# Patient Record
Sex: FEMALE | URBAN - METROPOLITAN AREA
[De-identification: names, ages, dates, MRNs, and addresses within clinical notes are randomized per-mention and may not be internally consistent; named-entity substitution may affect disease eponyms.]

---

## 2019-12-18 ENCOUNTER — OFFICE VISIT (OUTPATIENT)
Dept: INTERNAL MEDICINE CLINIC | Age: 44
End: 2019-12-18

## 2019-12-18 VITALS
HEIGHT: 65 IN | HEART RATE: 77 BPM | BODY MASS INDEX: 24.16 KG/M2 | RESPIRATION RATE: 16 BRPM | TEMPERATURE: 97.4 F | DIASTOLIC BLOOD PRESSURE: 74 MMHG | WEIGHT: 145 LBS | OXYGEN SATURATION: 99 % | SYSTOLIC BLOOD PRESSURE: 117 MMHG

## 2019-12-18 DIAGNOSIS — R14.0 BLOATING SYMPTOM: ICD-10-CM

## 2019-12-18 DIAGNOSIS — Z00.00 PHYSICAL EXAM, ANNUAL: Primary | ICD-10-CM

## 2019-12-18 NOTE — PROGRESS NOTES
HISTORY OF PRESENT ILLNESS  Dana Bojorquez is a 45 y.o. female. HPI   Pt is here to establish care. BP  today is 117/74         Wt today is 145 lbs  Her wt is in nl ranges       Will get labs today       Pt thinks she has a gluten sensitivity   She states when she overeats carbs, alcohol or sugars she wakes up with heart palpitations, sweating   She would then vomit   She also states she gets some bloating with these sxs   She states this has been happening for about 10 years   States more recent episode was when she was home for [email or web address], did not have alcohol then   She has not had gluten in 13 days and has not noticed sxs   Will check celiac sprue and H pylori   Discussed alcohol can also have these effects   Discussed staying away from dietary triggers         Pt is going to [place] this summer   She knows she has had hep B series, unsure about hep A   Will check for immunity   Discussed going to travel clinics         PMHx: none     FMHx: father- heart disease (massive MI 17 years ago in his mid 46s), htn   Mother- htn, MS   Grandmother-- stomach cancer     PSHx:none     SHx: not , no kids   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 No past surgical history on file.    No results found for: WBC, WBCT, WBCPOC, HGB, HGBPOC, HCT, HCTPOC, PLT, PLTPOC, MCV, MCVPOC, HGBEXT, HCTEXT, PLTEXT  No results found for: CHOL, CHOLPOCT, HDL, LDL, LDLC, LDLCPOC, LDLCEXT, TRIGL, TGLPOCT, CHHD, CHHDX  No results found for: GFRNA, GFRNAPOC, GFRAA, GFRAAPOC, CREA, CREAPOC, BUN, IBUN, BUNPOC, NA, NAPOC, K, KPOCT, CL, CLPOC, CO2, CO2POC, MG, PHOS, ALBEU, PTH, PTHILT, EPO     Review of Systems   Constitutional: Negative for chills and fever. HENT: Negative for hearing loss and tinnitus. Eyes: Negative for blurred vision and double vision. Respiratory: Negative for shortness of breath and wheezing. Cardiovascular: Negative for chest pain and palpitations. Gastrointestinal: Positive for abdominal pain and nausea. Negative for vomiting. Genitourinary: Negative for dysuria and frequency. Musculoskeletal: Negative for back pain and falls. Skin: Negative for itching and rash. Neurological: Negative for dizziness, loss of consciousness and headaches. Psychiatric/Behavioral: Negative for depression. The patient is not nervous/anxious. Physical Exam  Constitutional:       General: She is not in acute distress. Appearance: She is well-developed. She is not diaphoretic. HENT:      Head: Normocephalic and atraumatic. Right Ear: Tympanic membrane, ear canal and external ear normal.      Left Ear: Tympanic membrane, ear canal and external ear normal.      Mouth/Throat:      Mouth: Mucous membranes are moist.      Pharynx: Oropharynx is clear. No oropharyngeal exudate or posterior oropharyngeal erythema. Eyes:      General: No scleral icterus. Right eye: No discharge. Left eye: No discharge. Conjunctiva/sclera: Conjunctivae normal.      Pupils: Pupils are equal, round, and reactive to light. Neck:      Musculoskeletal: Normal range of motion and neck supple. Vascular: No carotid bruit. Cardiovascular:      Rate and Rhythm: Normal rate and regular rhythm. Heart sounds: Normal heart sounds. No murmur. No friction rub. No gallop. Pulmonary:      Effort: Pulmonary effort is normal. No respiratory distress. Breath sounds: Normal breath sounds. No wheezing or rales. Chest:      Chest wall: No tenderness.    Abdominal:      General: There is no distension. Palpations: Abdomen is soft. There is no mass. Tenderness: There is no tenderness. There is no guarding or rebound. Hernia: No hernia is present. Musculoskeletal: Normal range of motion. General: No tenderness or deformity. Lymphadenopathy:      Cervical: No cervical adenopathy. Skin:     General: Skin is warm and dry. Coloration: Skin is not pale. Findings: No erythema or rash. Neurological:      Mental Status: She is alert and oriented to person, place, and time. Coordination: Coordination normal.   Psychiatric:         Mood and Affect: Mood normal.         Behavior: Behavior normal.         ASSESSMENT and PLAN    ICD-10-CM ICD-9-CM    1. Physical exam, annual                Nl wt nl bp flu shot utd mammo due she will schedule at [facility] pelvic exam utd eye exam due she will look up tdap records colo not yet needed labs ordered  Z00.00 V70.0 LIPID PANEL      METABOLIC PANEL, COMPREHENSIVE      CBC W/O DIFF      TSH 3RD GENERATION      HEMOGLOBIN A1C WITH EAG      HAV/HBV IMMUNE STATUS (PRO IV)      CELIAC ANTIBODY PROFILE   2. Bloating symptom                    Seems primarily related to high carb loads and alcohol discussed avoiding trigger foods will check celiac and hy pylori discussed egd for progressive sxs  R14.0 787.3 H PYLORI ABS, IGA AND IGG          Depression screen reviewed and negative. Scribed by Shyann Mckoy of [address] [place] [address] [address], as dictated by Dr. Raghu Bravo. Current diagnosis and concerns discussed with pt at length. Pt understands risks and benefits or current treatment plan and medications, and accepts the treatment and medication with any possible risks. Pt asks appropriate questions, which were answered. Pt was instructed to call with any concerns or problems. I have reviewed the note documented by the scribe. The services provided are my own.   The documentation is accurate

## 2019-12-19 LAB
ALBUMIN SERPL-MCNC: 4.2 G/DL (ref 3.5–5.5)
ALBUMIN/GLOB SERPL: 1.6 {RATIO} (ref 1.2–2.2)
ALP SERPL-CCNC: 49 IU/L (ref 39–117)
ALT SERPL-CCNC: 17 IU/L (ref 0–32)
AST SERPL-CCNC: 17 IU/L (ref 0–40)
BILIRUB SERPL-MCNC: 1 MG/DL (ref 0–1.2)
BUN SERPL-MCNC: 9 MG/DL (ref 6–24)
BUN/CREAT SERPL: 12 (ref 9–23)
CALCIUM SERPL-MCNC: 8.8 MG/DL (ref 8.7–10.2)
CHLORIDE SERPL-SCNC: 102 MMOL/L (ref 96–106)
CHOLEST SERPL-MCNC: 189 MG/DL (ref 100–199)
CO2 SERPL-SCNC: 22 MMOL/L (ref 20–29)
CREAT SERPL-MCNC: 0.74 MG/DL (ref 0.57–1)
ERYTHROCYTE [DISTWIDTH] IN BLOOD BY AUTOMATED COUNT: 11.8 % (ref 12.3–15.4)
EST. AVERAGE GLUCOSE BLD GHB EST-MCNC: 97 MG/DL
GLIADIN PEPTIDE IGA SER-ACNC: 3 UNITS (ref 0–19)
GLIADIN PEPTIDE IGG SER-ACNC: 4 UNITS (ref 0–19)
GLOBULIN SER CALC-MCNC: 2.7 G/DL (ref 1.5–4.5)
GLUCOSE SERPL-MCNC: 80 MG/DL (ref 65–99)
H PYLORI IGA SER-ACNC: <9 UNITS (ref 0–8.9)
H PYLORI IGG SER IA-ACNC: 0.12 INDEX VALUE (ref 0–0.79)
HAV AB SER QL IA: NEGATIVE
HAV IGM SERPL QL IA: NEGATIVE
HBA1C MFR BLD: 5 % (ref 4.8–5.6)
HBV CORE AB SERPL QL IA: NEGATIVE
HBV SURFACE AB SER QL: REACTIVE
HCT VFR BLD AUTO: 41.7 % (ref 34–46.6)
HDLC SERPL-MCNC: 56 MG/DL
HGB BLD-MCNC: 13.8 G/DL (ref 11.1–15.9)
IGA SERPL-MCNC: 324 MG/DL (ref 87–352)
LDLC SERPL CALC-MCNC: 114 MG/DL (ref 0–99)
MCH RBC QN AUTO: 30.7 PG (ref 26.6–33)
MCHC RBC AUTO-ENTMCNC: 33.1 G/DL (ref 31.5–35.7)
MCV RBC AUTO: 93 FL (ref 79–97)
PLATELET # BLD AUTO: 336 X10E3/UL (ref 150–450)
POTASSIUM SERPL-SCNC: 4.5 MMOL/L (ref 3.5–5.2)
PROT SERPL-MCNC: 6.9 G/DL (ref 6–8.5)
RBC # BLD AUTO: 4.49 X10E6/UL (ref 3.77–5.28)
SODIUM SERPL-SCNC: 138 MMOL/L (ref 134–144)
TRIGL SERPL-MCNC: 97 MG/DL (ref 0–149)
TSH SERPL DL<=0.005 MIU/L-ACNC: 2.22 UIU/ML (ref 0.45–4.5)
TTG IGA SER-ACNC: <2 U/ML (ref 0–3)
TTG IGG SER-ACNC: 2 U/ML (ref 0–5)
VLDLC SERPL CALC-MCNC: 19 MG/DL (ref 5–40)
WBC # BLD AUTO: 5.8 X10E3/UL (ref 3.4–10.8)

## 2021-05-11 ENCOUNTER — OFFICE VISIT (OUTPATIENT)
Dept: INTERNAL MEDICINE CLINIC | Age: 46
End: 2021-05-11
Payer: COMMERCIAL

## 2021-05-11 VITALS
DIASTOLIC BLOOD PRESSURE: 77 MMHG | HEART RATE: 86 BPM | OXYGEN SATURATION: 97 % | BODY MASS INDEX: 25.26 KG/M2 | WEIGHT: 151.6 LBS | SYSTOLIC BLOOD PRESSURE: 122 MMHG | RESPIRATION RATE: 16 BRPM | TEMPERATURE: 97.8 F | HEIGHT: 65 IN

## 2021-05-11 DIAGNOSIS — Z00.00 PHYSICAL EXAM: Primary | ICD-10-CM

## 2021-05-11 LAB
ALBUMIN SERPL-MCNC: 3.4 G/DL (ref 3.5–5)
ALBUMIN/GLOB SERPL: 1.1 {RATIO} (ref 1.1–2.2)
ALP SERPL-CCNC: 57 U/L (ref 45–117)
ALT SERPL-CCNC: 16 U/L (ref 12–78)
ANION GAP SERPL CALC-SCNC: 5 MMOL/L (ref 5–15)
AST SERPL-CCNC: 8 U/L (ref 15–37)
BILIRUB SERPL-MCNC: 0.5 MG/DL (ref 0.2–1)
BUN SERPL-MCNC: 13 MG/DL (ref 6–20)
BUN/CREAT SERPL: 18 (ref 12–20)
CALCIUM SERPL-MCNC: 8.4 MG/DL (ref 8.5–10.1)
CHLORIDE SERPL-SCNC: 111 MMOL/L (ref 97–108)
CHOLEST SERPL-MCNC: 193 MG/DL
CO2 SERPL-SCNC: 25 MMOL/L (ref 21–32)
CREAT SERPL-MCNC: 0.72 MG/DL (ref 0.55–1.02)
ERYTHROCYTE [DISTWIDTH] IN BLOOD BY AUTOMATED COUNT: 13.2 % (ref 11.5–14.5)
GLOBULIN SER CALC-MCNC: 3.2 G/DL (ref 2–4)
GLUCOSE SERPL-MCNC: 91 MG/DL (ref 65–100)
HCT VFR BLD AUTO: 40.5 % (ref 35–47)
HCV AB SERPL QL IA: NONREACTIVE
HCV COMMENT,HCGAC: NORMAL
HDLC SERPL-MCNC: 60 MG/DL
HDLC SERPL: 3.2 {RATIO} (ref 0–5)
HGB BLD-MCNC: 13.1 G/DL (ref 11.5–16)
LDLC SERPL CALC-MCNC: 120.4 MG/DL (ref 0–100)
LIPID PROFILE,FLP: ABNORMAL
MCH RBC QN AUTO: 30.7 PG (ref 26–34)
MCHC RBC AUTO-ENTMCNC: 32.3 G/DL (ref 30–36.5)
MCV RBC AUTO: 94.8 FL (ref 80–99)
NRBC # BLD: 0 K/UL (ref 0–0.01)
NRBC BLD-RTO: 0 PER 100 WBC
PLATELET # BLD AUTO: 335 K/UL (ref 150–400)
PMV BLD AUTO: 9.8 FL (ref 8.9–12.9)
POTASSIUM SERPL-SCNC: 4.7 MMOL/L (ref 3.5–5.1)
PROT SERPL-MCNC: 6.6 G/DL (ref 6.4–8.2)
RBC # BLD AUTO: 4.27 M/UL (ref 3.8–5.2)
SODIUM SERPL-SCNC: 141 MMOL/L (ref 136–145)
TRIGL SERPL-MCNC: 63 MG/DL (ref ?–150)
TSH SERPL DL<=0.05 MIU/L-ACNC: 1.34 UIU/ML (ref 0.36–3.74)
VLDLC SERPL CALC-MCNC: 12.6 MG/DL
WBC # BLD AUTO: 6.3 K/UL (ref 3.6–11)

## 2021-05-11 PROCEDURE — 99396 PREV VISIT EST AGE 40-64: CPT | Performed by: INTERNAL MEDICINE

## 2021-10-06 NOTE — PROGRESS NOTES
HISTORY OF PRESENT ILLNESS  Alex Santoyo is a 42 y.o. female. HPI     Last here 5/11/21. Here for acute care.    This is an established visit completed with telemedicine was completed with video assist  the patient acknowledges and agrees to this method of visitation amirahyme    She states that a month ago on a hike she was feeling off, had stomach ache and had to call off the hike  She then got really bad heartburn, stated that this was debilitating and went to an urgent care  She states that this started to subside on her own after 5-6 hours, mentions that she was given meds including lidocaine for this but unsure whether this helped  She also tried to take tums for this  She also mentions that for 2 weeks she was constipated, lightheaded and generally feeling off  Her sxs her started to improve over the last week  Denies exertional CP  She states that she did not get an EKG at the urgent care, unsure whether or not they checked troponins  Denies current reflux or heartburn sxs  She states that the only other time she experienced this type of heartburn was after her moderna covid vaccine for 4 hours  Discussed this could have been angioedema, discussed that this can be d/t meds or can be idiopathic, zyrtec can help with this  Discussed this can also be vagal response, esophageal spasms, does not sound cardiac  Discussed if this were to happen again go to ED, take prilosec every AM and pepcid in evening, could also add zyrtec if particularly bad  Mentioned that if this happens again check BP to see if it is dropping  Discussed that moderna vaccine may have been initial trigger, but was unlikely to have caused the second episode  Discussed if this happens again journaling what she ate that day     BP today is controlled  BP at urgent care 997C systolic     Wt was 707 lbs lov     Reviewed labs      PREVENTIVE:  Colonoscopy: not yet needed   Pap: Dr Claire Reap 1/21  Mammogram: 1/21  Dexa: not yet needed   Tdap: pt thinks she is utd, she will check   Pneumovax: not yet needed   Shingrix: not yet needed   Flu shot: Fall 2021  Eye exam: [facility] 2/21  Lipids: 5/21   Hep B series: completed   Hep A series: holding off for now  A1c: 12/19 5.0  Hep C screen: 5/21 negative  Covid: both (moderna)    There are no problems to display for this patient. Current Outpatient Medications   Medication Sig Dispense Refill    levonorgestrel-ethin estradiol (LEVONORGESTREL-ETHINYL ESTRAD PO) levonorgestrel       History reviewed. No pertinent surgical history. Lab Results   Component Value Date/Time    WBC 6.3 05/11/2021 10:59 AM    HGB 13.1 05/11/2021 10:59 AM    HCT 40.5 05/11/2021 10:59 AM    PLATELET 285 86/61/2295 10:59 AM    MCV 94.8 05/11/2021 10:59 AM     Lab Results   Component Value Date/Time    Cholesterol, total 193 05/11/2021 10:59 AM    HDL Cholesterol 60 05/11/2021 10:59 AM    LDL, calculated 120.4 (H) 05/11/2021 10:59 AM    Triglyceride 63 05/11/2021 10:59 AM    CHOL/HDL Ratio 3.2 05/11/2021 10:59 AM     Lab Results   Component Value Date/Time    GFR est non-AA >60 05/11/2021 10:59 AM    GFR est AA >60 05/11/2021 10:59 AM    Creatinine 0.72 05/11/2021 10:59 AM    BUN 13 05/11/2021 10:59 AM    Sodium 141 05/11/2021 10:59 AM    Potassium 4.7 05/11/2021 10:59 AM    Chloride 111 (H) 05/11/2021 10:59 AM    CO2 25 05/11/2021 10:59 AM        Review of Systems   Respiratory: Negative for shortness of breath. Cardiovascular: Negative for chest pain. Physical Exam  Constitutional:       General: She is not in acute distress. Appearance: Normal appearance. She is not ill-appearing, toxic-appearing or diaphoretic. HENT:      Head: Normocephalic and atraumatic. Eyes:      General:         Right eye: No discharge. Left eye: No discharge. Conjunctiva/sclera: Conjunctivae normal.   Pulmonary:      Effort: No respiratory distress.    Neurological:      Mental Status: She is alert and oriented to person, place, and time. Mental status is at baseline. Gait: Gait normal.   Psychiatric:         Mood and Affect: Mood normal.         Behavior: Behavior normal.         ASSESSMENT and PLAN    ICD-10-CM ICD-9-CM    1. Gastroesophageal reflux disease without esophagitis         Patient reports an episode of GI upset and weakness associated with it    This occurred about a month ago and she had an evaluation at an urgent care which ultimately was unremarkable    She had 1 day of more severe symptoms and then some mild symptoms the following week now resolved    Incidentally when she had her Covid vaccine she had similar symptoms    Symptoms appear to be GI unclear if she is having a vagal event from GI upset causing her to feel weak versus some sort of angioedema of the gut causing her symptoms    Ultimately her symptoms have all resolved and there was no clear inciting event for the episode a few weeks ago    Discussed happens again to journal with food that she ate that day    Discussed if it happens again we will start treatment with the Prilosec and Pepcid and potentially could add an antihistamine in case of allergic reaction    No additional work-up for now given isolated event that resolved on its own but would need a more thorough GI evaluation if this became a recurrent problem discussed this at great length K21.9 530.81    2. Chronic fatigue see above R53.82 780.79         Depression screen reviewed and negative     Scribed by Stacie Garcia, as dictated by Dr. Stephy Mcgill. Current diagnosis and concerns discussed with pt at length. Pt understands risks and benefits or current treatment plan and medications, and accepts the treatment and medication with any possible risks. Pt asks appropriate questions, which were answered. Pt was instructed to call with any concerns or problems. I have reviewed the note documented by the scribe. The services provided are my own.   The documentation is accurate     Yamileth Lorenzo was evaluated through a synchronous (real-time) audio-video encounter. The patient (or guardian if applicable) is aware that this is a billable service. Verbal consent to proceed has been obtained within the past 12 months. The visit was conducted pursuant to the emergency declaration under the [address] [place] [address] and the [employer] Act. Patient identification was verified, and a caregiver was present when appropriate. The patient was located in a state where the provider was credentialed to provide care.

## 2021-10-08 ENCOUNTER — VIRTUAL VISIT (OUTPATIENT)
Dept: INTERNAL MEDICINE CLINIC | Age: 46
End: 2021-10-08
Payer: COMMERCIAL

## 2021-10-08 DIAGNOSIS — K21.9 GASTROESOPHAGEAL REFLUX DISEASE WITHOUT ESOPHAGITIS: Primary | ICD-10-CM

## 2021-10-08 DIAGNOSIS — R53.82 CHRONIC FATIGUE: ICD-10-CM

## 2021-10-08 PROCEDURE — 99213 OFFICE O/P EST LOW 20 MIN: CPT | Performed by: INTERNAL MEDICINE

## 2022-06-01 NOTE — PROGRESS NOTES
HISTORY OF PRESENT ILLNESS  Delmar Epperson is a 42 y.o. female. HPI     Last here vv 10/08/21. Here for routine care. She c/o lower back pain, states that this is burning and sometimes goes down leg  This started after moving furniture and initially started from triathlon training  Has been taking aleve   Will order medrol dosepack    She mentions that she has been feeling increased stress and anxiety recently  Her mother has been having health issues now has end-stage renal disease is on dialysis from an acute kidney injury and work has been stressful recently changing jobs she is on the last 2 weeks of recurrent job starting another job in 3 weeks  Will order ativan to taken prn not for daily use    Last visit here for acute care for severe heartburn     BP  today is 122/77     Wt is 151 lbs - up 6 lbs x lov        Reviewed labs   Will get labs today      PREVENTIVE:  Colonoscopy: Due at age 41 placed referral  Pap: Dr Jose F Angeles 3/18/22  Mammogram: 3/18/22  Dexa: not yet needed   Tdap: pt thinks she is utd, she will check discussed again  Pneumovax: not yet needed   Shingrix: not yet needed   Flu shot: Fall 2021  Eye exam: [facility] 2/22  Lipids: 5/21   Hep B series: completed   Hep A series: holding off for now  A1c: 12/19 5.0  Hep C screen: 5/21 negative  Covid: three doses (moderna)    There are no problems to display for this patient. Current Outpatient Medications   Medication Sig Dispense Refill    LORazepam (ATIVAN) 0.5 mg tablet Take 1 Tablet by mouth nightly as needed for Anxiety. Max Daily Amount: 0.5 mg. 20 Tablet 1    methylPREDNISolone (MEDROL DOSEPACK) 4 mg tablet Per pack 1 Dose Pack 0    levonorgestrel-ethin estradiol (LEVONORGESTREL-ETHINYL ESTRAD PO) levonorgestrel       History reviewed. No pertinent surgical history.    Lab Results   Component Value Date/Time    WBC 6.3 05/11/2021 10:59 AM    HGB 13.1 05/11/2021 10:59 AM    HCT 40.5 05/11/2021 10:59 AM    PLATELET 027 05/11/2021 10:59 AM    MCV 94.8 05/11/2021 10:59 AM     Lab Results   Component Value Date/Time    Cholesterol, total 193 05/11/2021 10:59 AM    HDL Cholesterol 60 05/11/2021 10:59 AM    LDL, calculated 120.4 (H) 05/11/2021 10:59 AM    Triglyceride 63 05/11/2021 10:59 AM    CHOL/HDL Ratio 3.2 05/11/2021 10:59 AM     Lab Results   Component Value Date/Time    GFR est non-AA >60 05/11/2021 10:59 AM    GFR est AA >60 05/11/2021 10:59 AM    Creatinine 0.72 05/11/2021 10:59 AM    BUN 13 05/11/2021 10:59 AM    Sodium 141 05/11/2021 10:59 AM    Potassium 4.7 05/11/2021 10:59 AM    Chloride 111 (H) 05/11/2021 10:59 AM    CO2 25 05/11/2021 10:59 AM        Review of Systems   Respiratory: Negative for shortness of breath. Cardiovascular: Negative for chest pain. Musculoskeletal:        Lower back pain with radiation into leg       Physical Exam  Constitutional:       General: She is not in acute distress. Appearance: Normal appearance. She is not ill-appearing, toxic-appearing or diaphoretic. HENT:      Head: Normocephalic and atraumatic. Right Ear: External ear normal.      Left Ear: External ear normal.   Eyes:      General:         Right eye: No discharge. Left eye: No discharge. Conjunctiva/sclera: Conjunctivae normal.      Pupils: Pupils are equal, round, and reactive to light. Cardiovascular:      Rate and Rhythm: Normal rate and regular rhythm. Heart sounds: No murmur heard. No friction rub. No gallop. Pulmonary:      Effort: No respiratory distress. Breath sounds: Normal breath sounds. No wheezing or rales. Chest:      Chest wall: No tenderness. Musculoskeletal:         General: Normal range of motion. Cervical back: Normal range of motion and neck supple. Skin:     General: Skin is warm and dry. Neurological:      Mental Status: She is alert and oriented to person, place, and time. Mental status is at baseline.       Coordination: Coordination normal. Gait: Gait normal.   Psychiatric:         Mood and Affect: Mood normal.         Behavior: Behavior normal.         ASSESSMENT and PLAN    ICD-10-CM ICD-9-CM    1. Annual physical exam    Z00.00 V70.0 REFERRAL TO GASTROENTEROLOGY      LIPID PANEL   COVID-vaccine up-to-date    She still needs to look up the timing of her Tdap and will send us an email    Pelvic and mammogram are up-to-date    Colonoscopy due next month when she turns 41    Placed referral    Eye exam up-to-date    Labs ordered    Weight blood pressure okay   METABOLIC PANEL, COMPREHENSIVE      HEMOGLOBIN A1C WITH EAG      TSH 3RD GENERATION      CBC W/O DIFF   2. Anxiety     Ordered Ativan for as needed use     F41.9 300.00 LORazepam (ATIVAN) 0.5 mg tablet   3. Hip pain, acute, right       Has a sciatic component to this likely overuse injury we will treat with Medrol Dosepak physical therapy if not improving discussed no pushing pulling or lifting over 10 pounds for 2 weeks minimum M25.551 719.45         Scribed by [employer] [facility] [facility], as dictated by Dr. Shilpa Wyman. Current diagnosis and concerns discussed with pt at length. Pt understands risks and benefits or current treatment plan and medications, and accepts the treatment and medication with any possible risks. Pt asks appropriate questions, which were answered. Pt was instructed to call with any concerns or problems. I have reviewed the note documented by the scribe. The services provided are my own.   The documentation is accurate

## 2022-06-06 ENCOUNTER — OFFICE VISIT (OUTPATIENT)
Dept: INTERNAL MEDICINE CLINIC | Age: 47
End: 2022-06-06

## 2022-06-06 VITALS
OXYGEN SATURATION: 99 % | WEIGHT: 152 LBS | HEIGHT: 65 IN | HEART RATE: 74 BPM | SYSTOLIC BLOOD PRESSURE: 119 MMHG | RESPIRATION RATE: 16 BRPM | TEMPERATURE: 98.1 F | BODY MASS INDEX: 25.33 KG/M2 | DIASTOLIC BLOOD PRESSURE: 74 MMHG

## 2022-06-06 DIAGNOSIS — Z00.00 ANNUAL PHYSICAL EXAM: Primary | ICD-10-CM

## 2022-06-06 DIAGNOSIS — F41.9 ANXIETY: ICD-10-CM

## 2022-06-06 DIAGNOSIS — M25.551 HIP PAIN, ACUTE, RIGHT: ICD-10-CM

## 2022-06-06 PROCEDURE — 99396 PREV VISIT EST AGE 40-64: CPT | Performed by: INTERNAL MEDICINE

## 2022-06-06 RX ORDER — METHYLPREDNISOLONE 4 MG/1
TABLET ORAL
Qty: 1 DOSE PACK | Refills: 0 | Status: SHIPPED | OUTPATIENT
Start: 2022-06-06

## 2022-06-06 RX ORDER — LORAZEPAM 0.5 MG/1
0.5 TABLET ORAL
Qty: 20 TABLET | Refills: 1 | Status: SHIPPED | OUTPATIENT
Start: 2022-06-06 | End: 2022-11-04 | Stop reason: SDUPTHER

## 2022-06-06 NOTE — LETTER
6/6/2022 1:03 PM    Ms. Vishal Saleh  [address] [facility],[place] [id number]      Dear Care Provider    Please fax us the most recent office notes, labs and mammogram so that we may update the patient's records for continuity of care. Our fax number: [phone].     Patient:   Vishal Saleh  1975        Sincerely,      Arpit Arriaga MD

## 2022-06-07 LAB
ALBUMIN SERPL-MCNC: 3.8 G/DL (ref 3.5–5)
ALBUMIN/GLOB SERPL: 1.1 {RATIO} (ref 1.1–2.2)
ALP SERPL-CCNC: 50 U/L (ref 45–117)
ALT SERPL-CCNC: 15 U/L (ref 12–78)
ANION GAP SERPL CALC-SCNC: 7 MMOL/L (ref 5–15)
AST SERPL-CCNC: 15 U/L (ref 15–37)
BILIRUB SERPL-MCNC: 0.9 MG/DL (ref 0.2–1)
BUN SERPL-MCNC: 9 MG/DL (ref 6–20)
BUN/CREAT SERPL: 12 (ref 12–20)
CALCIUM SERPL-MCNC: 9.1 MG/DL (ref 8.5–10.1)
CHLORIDE SERPL-SCNC: 107 MMOL/L (ref 97–108)
CHOLEST SERPL-MCNC: 211 MG/DL
CO2 SERPL-SCNC: 24 MMOL/L (ref 21–32)
CREAT SERPL-MCNC: 0.76 MG/DL (ref 0.55–1.02)
ERYTHROCYTE [DISTWIDTH] IN BLOOD BY AUTOMATED COUNT: 13.3 % (ref 11.5–14.5)
EST. AVERAGE GLUCOSE BLD GHB EST-MCNC: 97 MG/DL
GLOBULIN SER CALC-MCNC: 3.4 G/DL (ref 2–4)
GLUCOSE SERPL-MCNC: 86 MG/DL (ref 65–100)
HBA1C MFR BLD: 5 % (ref 4–5.6)
HCT VFR BLD AUTO: 46.3 % (ref 35–47)
HDLC SERPL-MCNC: 54 MG/DL
HDLC SERPL: 3.9 {RATIO} (ref 0–5)
HGB BLD-MCNC: 14.4 G/DL (ref 11.5–16)
LDLC SERPL CALC-MCNC: 134 MG/DL (ref 0–100)
MCH RBC QN AUTO: 31.2 PG (ref 26–34)
MCHC RBC AUTO-ENTMCNC: 31.1 G/DL (ref 30–36.5)
MCV RBC AUTO: 100.2 FL (ref 80–99)
NRBC # BLD: 0 K/UL (ref 0–0.01)
NRBC BLD-RTO: 0 PER 100 WBC
PLATELET # BLD AUTO: 348 K/UL (ref 150–400)
PMV BLD AUTO: 10.1 FL (ref 8.9–12.9)
POTASSIUM SERPL-SCNC: 4.6 MMOL/L (ref 3.5–5.1)
PROT SERPL-MCNC: 7.2 G/DL (ref 6.4–8.2)
RBC # BLD AUTO: 4.62 M/UL (ref 3.8–5.2)
SODIUM SERPL-SCNC: 138 MMOL/L (ref 136–145)
TRIGL SERPL-MCNC: 115 MG/DL (ref ?–150)
TSH SERPL DL<=0.05 MIU/L-ACNC: 1.39 UIU/ML (ref 0.36–3.74)
VLDLC SERPL CALC-MCNC: 23 MG/DL
WBC # BLD AUTO: 6 K/UL (ref 3.6–11)

## 2022-06-13 DIAGNOSIS — M25.551 HIP PAIN, ACUTE, RIGHT: Primary | ICD-10-CM

## 2022-09-02 ENCOUNTER — HOSPITAL ENCOUNTER (OUTPATIENT)
Dept: LAB | Age: 47
Discharge: HOME | End: 2022-09-02
Payer: COMMERCIAL

## 2022-09-02 PROCEDURE — 87624 HPV HI-RISK TYP POOLED RSLT: CPT

## 2022-09-02 PROCEDURE — 88175 CYTOPATH C/V AUTO FLUID REDO: CPT

## 2022-11-01 NOTE — PROGRESS NOTES
HISTORY OF PRESENT ILLNESS  Gala Davalos is a 41 y.o. female. HPI  Last here 6/6/22. Here for routine care. This is an established visit completed with telemedicine was completed with video assist. The patient acknowledges and agrees to this method of visitation. Pt c/o fungus on BL big toes x >= 1 month. Rx'd jublia to use as long as covered. If not, referred to podiatry. Also c/o itch on L side of back (between shoulder blades) x 6 months that has worsened in past few days. Denies rash. Likely neuropathic itch. Advised against hot showers. Rx'd gabapentin to use PRN. Referred to dermatology for routine skin checks. No home BP readings for review     Wt lov 151 lbs      Reviewed labs   Will get labs today     She saw CHRISTIANO Lacy (ortho) for R hip pain 6/16/22  Reviewed note:  PLAN  Treatment Plan: X-rays of the patient's right hip display no acute or chronic abnormalities. Patient's lumbar x-rays are largely unremarkable outside of mild disc narrowing at L5-S1. Patient's history and physical exam is highly consistent with lumbar radiculopathy as she has a posterior buttock pain as continuous and burning. She also describes a numbness sensation at the lateral aspect of her right tibia/fibular region. No anterior groin pain can be elicited with special testing today. No greater trochanteric bursitis type pain can be elicited today. Patient prescribed a 5 day course of prednisone to complete as well as a muscle relaxer. She is going to follow up with our spine specialist for further evaluation treatment. Reviewed XR lumbar spine 6/16/22:  No evidence of acute fracture or dislocation. Vertebral bodies are   generally well aligned. There is mild disc narrowing at L5-S1. Reviewed XR hip R 6/16/22:  No evidence of an acute fracture dislocation. Femoral acetabular joint   spacing is well maintained without evidence of degenerative disease. Bones are normally mineralized.       She takes ativan to use PRN for stress and anxiety     PREVENTIVE:  Colonoscopy: Placed referral, she is working on this   Pap: Dr. Marcelo Keating 9/22  Mammogram: 3/18/22  Dexa: not yet needed   Tdap:4/23/2014  Pneumovax: not yet needed   Shingrix: not yet needed   Flu shot: Fall 2021  Eye exam: [facility] eye care 2/22  Lipids: 6/22   Hep B series: completed   Hep A series: holding off for now  A1c: 12/19 5.0 6/22 5.0  Hep C screen: 5/21 negative  Covid: three doses (moderna)       There are no problems to display for this patient. Current Outpatient Medications   Medication Sig Dispense Refill    LORazepam (ATIVAN) 0.5 mg tablet Take 1 Tablet by mouth nightly as needed for Anxiety. Max Daily Amount: 0.5 mg. 20 Tablet 1    methylPREDNISolone (MEDROL DOSEPACK) 4 mg tablet Per pack 1 Dose Pack 0    levonorgestrel-ethin estradiol (LEVONORGESTREL-ETHINYL ESTRAD PO) levonorgestrel       No past surgical history on file. Lab Results   Component Value Date/Time    WBC 6.0 06/06/2022 09:08 AM    HGB 14.4 06/06/2022 09:08 AM    HCT 46.3 06/06/2022 09:08 AM    PLATELET 942 82/12/3508 09:08 AM    .2 (H) 06/06/2022 09:08 AM     Lab Results   Component Value Date/Time    Cholesterol, total 211 (H) 06/06/2022 09:08 AM    HDL Cholesterol 54 06/06/2022 09:08 AM    LDL, calculated 134 (H) 06/06/2022 09:08 AM    Triglyceride 115 06/06/2022 09:08 AM    CHOL/HDL Ratio 3.9 06/06/2022 09:08 AM     Lab Results   Component Value Date/Time    GFR est non-AA >60 06/06/2022 09:08 AM    GFR est AA >60 06/06/2022 09:08 AM    Creatinine 0.76 06/06/2022 09:08 AM    BUN 9 06/06/2022 09:08 AM    Sodium 138 06/06/2022 09:08 AM    Potassium 4.6 06/06/2022 09:08 AM    Chloride 107 06/06/2022 09:08 AM    CO2 24 06/06/2022 09:08 AM        Review of Systems   Respiratory:  Negative for shortness of breath. Cardiovascular:  Negative for chest pain. Skin:  Positive for itching (back).      Physical Exam  Constitutional:       General: She is not in acute distress. Appearance: Normal appearance. She is not ill-appearing, toxic-appearing or diaphoretic. HENT:      Head: Normocephalic and atraumatic. Eyes:      General:         Right eye: No discharge. Left eye: No discharge. Conjunctiva/sclera: Conjunctivae normal.   Feet:      Left foot:      Toenail Condition: Left toenails are abnormally thick. Neurological:      General: No focal deficit present. Mental Status: She is alert and oriented to person, place, and time. Psychiatric:         Mood and Affect: Mood normal.         Behavior: Behavior normal.       ASSESSMENT and PLAN    ICD-10-CM ICD-9-CM    1. Itch  L29.9 698.9 gabapentin (NEURONTIN) 100 mg capsule   Will give gabapentin for neuropathic itch    Discussed routine Derm checkup   2. Anxiety  F41.9 300.00 LORazepam (ATIVAN) 0.5 mg tablet   Uses Ativan infrequently ordered   3. Onychomycosis  B35.1 110.1    Will give Jublia discussed would need nail clippings to confirm onychomycosis for oral therapy discussed therapy will be 6 to 12 months     Depression screen reviewed and negative. Scribed by Yani Donald, as dictated by Dr. Keith Flores. Current diagnosis and concerns discussed with pt at length. Pt understands risks and benefits or current treatment plan and medications, and accepts the treatment and medication with any possible risks. Pt asks appropriate questions, which were answered. Pt was instructed to call with any concerns or problems. I have reviewed the note documented by the scribe. The services provided are my own. The documentation is accurate. Geovanni Toussaint, who was evaluated through a synchronous (real-time) audio-video encounter, and/or her healthcare decision maker, is aware that it is a billable service, which includes applicable co-pays, with coverage as determined by her insurance carrier. She provided verbal consent to proceed and patient identification was verified.  This visit was conducted pursuant to the emergency declaration under the [facility], [facility] authority and the [employer] Act. A caregiver was present when appropriate. Ability to conduct physical exam was limited.  The patient was located at: Home: [address] [place] [phone]  The provider was located at: Home: [unfilled]    --Mariposa Torres on 11/1/2022 at 4:25 PM

## 2022-11-04 ENCOUNTER — VIRTUAL VISIT (OUTPATIENT)
Dept: INTERNAL MEDICINE CLINIC | Age: 47
End: 2022-11-04
Payer: COMMERCIAL

## 2022-11-04 DIAGNOSIS — B35.1 ONYCHOMYCOSIS: ICD-10-CM

## 2022-11-04 DIAGNOSIS — F41.9 ANXIETY: ICD-10-CM

## 2022-11-04 DIAGNOSIS — L29.9 ITCH: Primary | ICD-10-CM

## 2022-11-04 PROCEDURE — 99213 OFFICE O/P EST LOW 20 MIN: CPT | Performed by: INTERNAL MEDICINE

## 2022-11-04 RX ORDER — GABAPENTIN 100 MG/1
100 CAPSULE ORAL
Qty: 30 CAPSULE | Refills: 1 | Status: SHIPPED | OUTPATIENT
Start: 2022-11-04

## 2022-11-04 RX ORDER — LORAZEPAM 0.5 MG/1
0.5 TABLET ORAL
Qty: 20 TABLET | Refills: 1 | Status: SHIPPED | OUTPATIENT
Start: 2022-11-04

## 2022-11-04 RX ORDER — EFINACONAZOLE 100 MG/ML
SOLUTION TOPICAL
Qty: 8 ML | Refills: 1 | Status: SHIPPED | OUTPATIENT
Start: 2022-11-04

## 2022-11-04 NOTE — PROGRESS NOTES
1. \"Have you been to the ER, urgent care clinic since your last visit? Hospitalized since your last visit? \" No    2. \"Have you seen or consulted any other health care providers outside of the [address] [place] [address] since your last visit? \" No     3. For patients aged 39-70: Has the patient had a colonoscopy / FIT/ Cologuard? No      If the patient is female:    4. For patients aged 41-77: Has the patient had a mammogram within the past 2 years? Yes - Care Gap present. Most recent result on file      5. For patients aged 21-65: Has the patient had a pap smear? Yes - Care Gap present.  Most recent result on file

## 2022-12-09 DIAGNOSIS — F41.9 ANXIETY: ICD-10-CM

## 2022-12-12 NOTE — TELEPHONE ENCOUNTER
Future Appointments:  No future appointments. Last Appointment With Me:  11/4/2022     Requested Prescriptions     Pending Prescriptions Disp Refills    LORazepam (ATIVAN) 0.5 mg tablet 20 Tablet 1     Sig: Take 1 Tablet by mouth nightly as needed for Anxiety.  Max Daily Amount: 0.5 mg.

## 2022-12-13 NOTE — PROGRESS NOTES
HISTORY OF PRESENT ILLNESS  Sascha Obregon is a 41 y.o. female. HPI  Last here vv 11/4/22. Here for routine care. This is an established visit completed with telemedicine was completed with video assist. The patient acknowledges and agrees to this method of visitation. No home BP readings for review     Wt lov 151 lbs      Reviewed labs   Will get labs today     Lov I ordered jublia for toenail fungus   However, this unfortunately was not covered however her nail seems to be clearing up so she may not have had onychomycosis in the end    Lov she c/o itch on L side of back (between shoulder blades) not bothering her too much  Has not yet f/u'd w/ dermatology      She saw CHRISTIANO Morales (ortho) for R hip pain 6/16/22     She takes ativan to use PRN for stress and anxiety  States she is using it more frequently, almost every day, due to stress from her mother's illness and living far away. Discussed she likely needs a daily medications. Discussed at length SSRI's v. SNRI's, pros and cons of each, side effects etc.  Rx'd zoloft 50 mg. Discussed starting with 0.5 tablet for first week. PREVENTIVE:  Colonoscopy: Placed referral, she is working on this   Pap: Dr. Emilee Gamboa 9/22  Mammogram: 3/18/22  Dexa: not yet needed   Tdap:4/23/2014  Pneumovax: not yet needed   Shingrix: not yet needed   Flu shot: Fall 2022  Eye exam: [facility] eye care 2/22  Lipids: 6/22   A1c: 12/19 5.0 6/22 5.0  Hep B series: completed   Hep A series: holding off for now  Hep C screen: 5/21 negative  Covid: three doses (moderna)      There are no problems to display for this patient. Current Outpatient Medications   Medication Sig Dispense Refill    LORazepam (ATIVAN) 0.5 mg tablet Take 1 Tablet by mouth nightly as needed for Anxiety. Max Daily Amount: 0.5 mg. 20 Tablet 1    gabapentin (NEURONTIN) 100 mg capsule Take 1 Capsule by mouth nightly as needed for Pain.  Max Daily Amount: 100 mg. 30 Capsule 1    efinaconazole (Jublia) martín topical solution Apply daily 8 mL 1    methylPREDNISolone (MEDROL DOSEPACK) 4 mg tablet Per pack 1 Dose Pack 0    levonorgestrel-ethin estradiol (LEVONORGESTREL-ETHINYL ESTRAD PO) levonorgestrel       No past surgical history on file. Lab Results   Component Value Date/Time    WBC 6.0 06/06/2022 09:08 AM    HGB 14.4 06/06/2022 09:08 AM    HCT 46.3 06/06/2022 09:08 AM    PLATELET 062 85/22/9255 09:08 AM    .2 (H) 06/06/2022 09:08 AM     Lab Results   Component Value Date/Time    Cholesterol, total 211 (H) 06/06/2022 09:08 AM    HDL Cholesterol 54 06/06/2022 09:08 AM    LDL, calculated 134 (H) 06/06/2022 09:08 AM    Triglyceride 115 06/06/2022 09:08 AM    CHOL/HDL Ratio 3.9 06/06/2022 09:08 AM     Lab Results   Component Value Date/Time    GFR est non-AA >60 06/06/2022 09:08 AM    GFR est AA >60 06/06/2022 09:08 AM    Creatinine 0.76 06/06/2022 09:08 AM    BUN 9 06/06/2022 09:08 AM    Sodium 138 06/06/2022 09:08 AM    Potassium 4.6 06/06/2022 09:08 AM    Chloride 107 06/06/2022 09:08 AM    CO2 24 06/06/2022 09:08 AM        Review of Systems   Respiratory:  Negative for shortness of breath. Cardiovascular:  Negative for chest pain. Psychiatric/Behavioral:  The patient is nervous/anxious. Physical Exam  Constitutional:       General: She is not in acute distress. Appearance: Normal appearance. She is not ill-appearing, toxic-appearing or diaphoretic. HENT:      Head: Normocephalic and atraumatic. Eyes:      General:         Right eye: No discharge. Left eye: No discharge. Conjunctiva/sclera: Conjunctivae normal.   Neurological:      General: No focal deficit present. Mental Status: She is alert and oriented to person, place, and time. Psychiatric:         Mood and Affect: Mood normal.         Behavior: Behavior normal.       ASSESSMENT and PLAN    ICD-10-CM ICD-9-CM    1.  Anxiety  F41.9 300.00    Discussed treatment options we will go ahead and start Zoloft 50 mg daily    Start with half tablet the first week then increase to a full tablet    Discussed full benefit at 6 weeks    Can adjust medication at that time if needed    If Zoloft is not effective would consider perhaps Pristiq    Discussed SSRIs and SNRIs    Okay to use Ativan as needed for acute stress     Depression screen reviewed and negative. Scribed by Sarai Crocker, as dictated by Dr. Akin Pepe. Current diagnosis and concerns discussed with pt at length. Pt understands risks and benefits or current treatment plan and medications, and accepts the treatment and medication with any possible risks. Pt asks appropriate questions, which were answered. Pt was instructed to call with any concerns or problems. I have reviewed the note documented by the scribe. The services provided are my own. The documentation is accurate. Selena Coto, who was evaluated through a synchronous (real-time) audio-video encounter, and/or her healthcare decision maker, is aware that it is a billable service, which includes applicable co-pays, with coverage as determined by her insurance carrier. She provided verbal consent to proceed and patient identification was verified. This visit was conducted pursuant to the emergency declaration under the [facility], [address] [place] [address] [facility] authority and the [employer] Act. A caregiver was present when appropriate. Ability to conduct physical exam was limited.  The patient was located at: Home: [address] [phone]  The provider was located at: Home: [unfilled]    --Pj Liao on 12/13/2022 at 4:07 PM

## 2022-12-14 ENCOUNTER — TELEPHONE (OUTPATIENT)
Dept: INTERNAL MEDICINE CLINIC | Age: 47
End: 2022-12-14

## 2022-12-14 NOTE — TELEPHONE ENCOUNTER
----- Message from [id number] sent at 12/14/2022  1:37 PM EST -----  Subject: Message to Provider    QUESTIONS  Information for Provider? The pt is returning a call to [facility] in the   office regarding an appt this Friday 12/16/22. Multiple attempts made to   reach the office with a warm transfer and after holding for 12 minutes the   patient was no longer able to hold. I do not see any appt notes,   encounters, or messages to see the need of the call from [facility].  Can   someone please call the patient back to advise.  ---------------------------------------------------------------------------  --------------  Ayesha WALTERS  [phone]; OK to leave message on voicemail  ---------------------------------------------------------------------------  --------------  SCRIPT ANSWERS  undefined

## 2022-12-16 ENCOUNTER — VIRTUAL VISIT (OUTPATIENT)
Dept: INTERNAL MEDICINE CLINIC | Age: 47
End: 2022-12-16
Payer: COMMERCIAL

## 2022-12-16 DIAGNOSIS — F41.9 ANXIETY: Primary | ICD-10-CM

## 2022-12-16 RX ORDER — SERTRALINE HYDROCHLORIDE 50 MG/1
50 TABLET, FILM COATED ORAL DAILY
Qty: 90 TABLET | Refills: 0 | Status: SHIPPED | OUTPATIENT
Start: 2022-12-16

## 2022-12-16 NOTE — PROGRESS NOTES
1. \"Have you been to the ER, urgent care clinic since your last visit? Hospitalized since your last visit? \" No    2. \"Have you seen or consulted any other health care providers outside of the [address] [place] [address] since your last visit? \" No     3. For patients aged 39-70: Has the patient had a colonoscopy / FIT/ Cologuard? No      If the patient is female:    4. For patients aged 41-77: Has the patient had a mammogram within the past 2 years? Yes - Care Gap present. Most recent result on file      5. For patients aged 21-65: Has the patient had a pap smear? Yes - Care Gap present.  Most recent result on file

## 2022-12-17 RX ORDER — LORAZEPAM 0.5 MG/1
0.5 TABLET ORAL
Qty: 20 TABLET | Refills: 1 | Status: SHIPPED | OUTPATIENT
Start: 2022-12-17

## 2023-01-18 DIAGNOSIS — F41.9 ANXIETY: ICD-10-CM

## 2023-01-23 DIAGNOSIS — F41.9 ANXIETY: ICD-10-CM

## 2023-01-23 RX ORDER — LORAZEPAM 0.5 MG/1
0.5 TABLET ORAL
Qty: 20 TABLET | Refills: 1 | Status: SHIPPED | OUTPATIENT
Start: 2023-01-23

## 2023-01-23 NOTE — TELEPHONE ENCOUNTER
PCP: Loyd Campbell MD    Last appt: 12/16/2022  No future appointments. Requested Prescriptions     Pending Prescriptions Disp Refills    LORazepam (ATIVAN) 0.5 mg tablet 20 Tablet 1     Sig: Take 1 Tablet by mouth nightly as needed for Anxiety.  Max Daily Amount: 0.5 mg.

## 2023-01-26 RX ORDER — LORAZEPAM 0.5 MG/1
TABLET ORAL
Qty: 20 TABLET | Refills: 0 | Status: SHIPPED | OUTPATIENT
Start: 2023-01-26

## 2023-02-10 ENCOUNTER — PATIENT MESSAGE (OUTPATIENT)
Dept: INTERNAL MEDICINE CLINIC | Age: 48
End: 2023-02-10

## 2023-02-10 DIAGNOSIS — H04.129 DRY EYE: Primary | ICD-10-CM

## 2023-02-10 DIAGNOSIS — R06.9 BREATHING PROBLEM: ICD-10-CM

## 2023-02-10 NOTE — TELEPHONE ENCOUNTER
Fide Hendrix MD 2/10/2023 2:26 PM EST    Refer to allergist  ----- Message -----  From: Nilay Hally: 2/10/2023 10:43 AM EST  To: Aysha Shukla  Subject: FW: Allergy testing ??       ----- Message -----  From: Asya Hines  Sent: 2/10/2023 10:21 AM EST  To: [facility] Nurse Pool  Subject: Allergy testing ? ? Good morning,    Does Dr. Omega Ellington have the ability or can she refer me to get some allergy testing done? Ever since I moved to [place], [place] I have had some issues with severe dry eye, and more frequent bouts of dry scratchy throat and sneezing. Not sure if its the air vents in my apartment or something else. Please let me know and have a great weekend.      Kathie Oconnell

## 2023-03-08 DIAGNOSIS — F41.9 ANXIETY: ICD-10-CM

## 2023-03-08 NOTE — TELEPHONE ENCOUNTER
PCP: Jalen Barnard MD    Last appt: 12/16/2022  No future appointments.     Requested Prescriptions     Pending Prescriptions Disp Refills    LORazepam (ATIVAN) 0.5 mg tablet 20 Tablet 0

## 2023-03-09 RX ORDER — LORAZEPAM 0.5 MG/1
0.5 TABLET ORAL AS NEEDED
Qty: 20 TABLET | Refills: 0 | Status: SHIPPED | OUTPATIENT
Start: 2023-03-09

## 2023-03-14 ENCOUNTER — PATIENT MESSAGE (OUTPATIENT)
Dept: INTERNAL MEDICINE CLINIC | Age: 48
End: 2023-03-14

## 2023-03-14 DIAGNOSIS — F41.9 ANXIETY: ICD-10-CM

## 2023-03-14 RX ORDER — SERTRALINE HYDROCHLORIDE 50 MG/1
TABLET, FILM COATED ORAL
Qty: 90 TABLET | Refills: 0 | Status: SHIPPED | OUTPATIENT
Start: 2023-03-14

## 2023-03-14 NOTE — TELEPHONE ENCOUNTER
From: [place]  To: Marylou Garcia MD  Sent: 3/14/2023 7:42 AM EDT  Subject: Incorrect refill     Good morning,    I did not need a sertraline refill. I needed my Lorezapam refilled (after [place]   You all authorized the refill but accidentally wrote the prescription incorrect (max amount a day) so the pharmacy sent it back. I am still waiting for that prescription.     Jose David Kolb

## 2023-03-14 NOTE — TELEPHONE ENCOUNTER
Future Appointments:  No future appointments. Last Appointment With Me:  12/16/2022     Requested Prescriptions     Pending Prescriptions Disp Refills    LORazepam (ATIVAN) 0.5 mg tablet 20 Tablet 1     Sig: Take 1 Tablet by mouth nightly as needed for Anxiety.  Max Daily Amount: 0.5 mg.

## 2023-03-15 RX ORDER — LORAZEPAM 0.5 MG/1
0.5 TABLET ORAL
Qty: 20 TABLET | Refills: 1 | Status: SHIPPED | OUTPATIENT
Start: 2023-03-15

## 2023-05-25 RX ORDER — LORAZEPAM 0.5 MG/1
0.5 TABLET ORAL
COMMUNITY
Start: 2023-03-15 | End: 2023-06-29 | Stop reason: SDUPTHER

## 2023-05-25 RX ORDER — GABAPENTIN 100 MG/1
100 CAPSULE ORAL
COMMUNITY
Start: 2022-11-04

## 2023-05-25 RX ORDER — EFINACONAZOLE 100 MG/ML
SOLUTION TOPICAL
COMMUNITY
Start: 2022-11-04

## 2023-05-25 RX ORDER — METHYLPREDNISOLONE 4 MG/1
TABLET ORAL
COMMUNITY
Start: 2022-06-06

## 2023-06-27 DIAGNOSIS — F41.9 ANXIETY DISORDER, UNSPECIFIED TYPE: Primary | ICD-10-CM

## 2023-06-29 RX ORDER — LORAZEPAM 0.5 MG/1
0.5 TABLET ORAL NIGHTLY PRN
Qty: 30 TABLET | Refills: 0 | Status: SHIPPED | OUTPATIENT
Start: 2023-06-29 | End: 2023-07-29

## 2023-07-07 ENCOUNTER — TELEPHONE (OUTPATIENT)
Age: 48
End: 2023-07-07

## 2023-07-07 NOTE — TELEPHONE ENCOUNTER
----- Message from Gillian Baker sent at 7/7/2023 12:36 PM EDT -----  Subject: Message to Provider    QUESTIONS  Information for Provider? Missed call, was calling Johanny Thornton back. Is   available now and into the evening.   ---------------------------------------------------------------------------  --------------  Man TOM  [phone]; OK to leave message on voicemail  ---------------------------------------------------------------------------  --------------  SCRIPT ANSWERS  Relationship to Patient?  Self

## 2023-07-26 DIAGNOSIS — F41.9 ANXIETY DISORDER, UNSPECIFIED TYPE: ICD-10-CM

## 2023-07-27 NOTE — TELEPHONE ENCOUNTER
Future Appointments:  No future appointments. Last Appointment With Me:  12/16/2022     Requested Prescriptions     Pending Prescriptions Disp Refills    LORazepam (ATIVAN) 0.5 MG tablet 30 tablet 0     Sig: Take 1 tablet by mouth nightly as needed for Anxiety for up to 30 days.  Max Daily Amount: 0.5 mg

## 2023-07-28 RX ORDER — LORAZEPAM 0.5 MG/1
0.5 TABLET ORAL NIGHTLY PRN
Qty: 30 TABLET | Refills: 0 | Status: SHIPPED | OUTPATIENT
Start: 2023-07-28 | End: 2023-08-27

## 2023-08-24 DIAGNOSIS — F41.9 ANXIETY DISORDER, UNSPECIFIED TYPE: ICD-10-CM

## 2023-08-24 RX ORDER — LORAZEPAM 0.5 MG/1
0.5 TABLET ORAL NIGHTLY PRN
Qty: 20 TABLET | Refills: 0 | Status: SHIPPED | OUTPATIENT
Start: 2023-08-24 | End: 2023-08-29 | Stop reason: SDUPTHER

## 2023-08-29 DIAGNOSIS — F41.9 ANXIETY DISORDER, UNSPECIFIED TYPE: ICD-10-CM

## 2023-08-29 RX ORDER — LORAZEPAM 0.5 MG/1
0.5 TABLET ORAL NIGHTLY PRN
Qty: 20 TABLET | Refills: 0 | Status: SHIPPED | OUTPATIENT
Start: 2023-08-29 | End: 2023-09-28